# Patient Record
Sex: FEMALE | Race: WHITE | Employment: FULL TIME | URBAN - METROPOLITAN AREA
[De-identification: names, ages, dates, MRNs, and addresses within clinical notes are randomized per-mention and may not be internally consistent; named-entity substitution may affect disease eponyms.]

---

## 2023-09-20 ENCOUNTER — OFFICE VISIT (OUTPATIENT)
Dept: PRIMARY CARE CLINIC | Age: 28
End: 2023-09-20
Payer: COMMERCIAL

## 2023-09-20 VITALS
WEIGHT: 209.4 LBS | TEMPERATURE: 98.3 F | BODY MASS INDEX: 35.75 KG/M2 | RESPIRATION RATE: 16 BRPM | HEART RATE: 96 BPM | HEIGHT: 64 IN | OXYGEN SATURATION: 98 % | DIASTOLIC BLOOD PRESSURE: 80 MMHG | SYSTOLIC BLOOD PRESSURE: 132 MMHG

## 2023-09-20 DIAGNOSIS — S41.112A LACERATION OF LEFT UPPER ARM, INITIAL ENCOUNTER: ICD-10-CM
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
## 2023-09-20 DIAGNOSIS — T14.8XXA ABRASION: ICD-10-CM

## 2023-09-20 DIAGNOSIS — S71.111A LACERATION OF RIGHT THIGH, INITIAL ENCOUNTER: ICD-10-CM

## 2023-09-20 PROCEDURE — G8427 DOCREV CUR MEDS BY ELIG CLIN: HCPCS

## 2023-09-20 PROCEDURE — 90715 TDAP VACCINE 7 YRS/> IM: CPT

## 2023-09-20 PROCEDURE — 1036F TOBACCO NON-USER: CPT

## 2023-09-20 PROCEDURE — 99203 OFFICE O/P NEW LOW 30 MIN: CPT

## 2023-09-20 PROCEDURE — 90471 IMMUNIZATION ADMIN: CPT

## 2023-09-20 PROCEDURE — G8417 CALC BMI ABV UP PARAM F/U: HCPCS

## 2023-09-20 SDOH — ECONOMIC STABILITY: HOUSING INSECURITY
IN THE LAST 12 MONTHS, WAS THERE A TIME WHEN YOU DID NOT HAVE A STEADY PLACE TO SLEEP OR SLEPT IN A SHELTER (INCLUDING NOW)?: NO

## 2023-09-20 SDOH — ECONOMIC STABILITY: FOOD INSECURITY: WITHIN THE PAST 12 MONTHS, THE FOOD YOU BOUGHT JUST DIDN'T LAST AND YOU DIDN'T HAVE MONEY TO GET MORE.: NEVER TRUE

## 2023-09-20 SDOH — ECONOMIC STABILITY: FOOD INSECURITY: WITHIN THE PAST 12 MONTHS, YOU WORRIED THAT YOUR FOOD WOULD RUN OUT BEFORE YOU GOT MONEY TO BUY MORE.: NEVER TRUE

## 2023-09-20 SDOH — ECONOMIC STABILITY: INCOME INSECURITY: HOW HARD IS IT FOR YOU TO PAY FOR THE VERY BASICS LIKE FOOD, HOUSING, MEDICAL CARE, AND HEATING?: NOT VERY HARD

## 2023-09-20 ASSESSMENT — PATIENT HEALTH QUESTIONNAIRE - PHQ9
SUM OF ALL RESPONSES TO PHQ QUESTIONS 1-9: 0
SUM OF ALL RESPONSES TO PHQ9 QUESTIONS 1 & 2: 0
2. FEELING DOWN, DEPRESSED OR HOPELESS: 0
1. LITTLE INTEREST OR PLEASURE IN DOING THINGS: 0
SUM OF ALL RESPONSES TO PHQ QUESTIONS 1-9: 0

## 2023-09-20 NOTE — PROGRESS NOTES
Chief Complaint:   Other (Dog bite on left arm and back of right leg and paw scratches on back  last night   had just gotten dog on Sunday and dog has been put down this am  thinks she had last tetanus in 2015 or 2016.)      History of Present Illness   Source of history provided by:  patient. Marc Dooley is a 32 y.o. old female presenting to the walk in care for a dog bite that happened last night. She had recently a got a dog from a family member that was re homing the dog. She reports the dog was up to date on all his immunizations including rabies vaccine. Dog bite to left forearm and right thigh , which occured 1 day(s) prior to arrival.  Since onset the symptoms have been persistent. She did clean the wounds with soap and water and used peroxide last night when it happened. Symptoms:    Pain:  yes     Redness:   Slight . Pruritis:   no     Rash:   no     Swelling:   yes     Laceration:   Yes left forearm and right thigh      Abrasion:   Yes scratch on the back      Pustule:   no     Puncture:   yes     Other:   N/A     Tetanus Status:  Unsure last Tetanus injection . Rabies Risk Assessment:    Dog:   Up to date on rabies vaccines      Cat:        Rodent:        Bat:        Other:        If Animal Related, Then;                   Abnormal Behavior Witnessed:  No.                  Geographic Location Where Bitten:  left forearm and right thigh. Immunization Status of Animal:  Immune.     Associated Signs & Symptoms:    Fever/Chills:   No     Swelling:   No      Drainage:   No     Review of Systems    Unless otherwise stated in this report or unable to obtain because of the patient's clinical or mental status as evidenced by the medical record, this patients's positive and negative responses for Review of Systems, constitutional, psych, eyes, ENT, cardiovascular, respiratory, gastrointestinal, neurological, genitourinary, musculoskeletal, integument systems and systems